# Patient Record
Sex: FEMALE | Race: WHITE | Employment: STUDENT | URBAN - METROPOLITAN AREA
[De-identification: names, ages, dates, MRNs, and addresses within clinical notes are randomized per-mention and may not be internally consistent; named-entity substitution may affect disease eponyms.]

---
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
## 2024-07-25 ENCOUNTER — HOSPITAL ENCOUNTER (OUTPATIENT)
Age: 19
Discharge: HOME | End: 2024-07-25
Payer: COMMERCIAL

## 2024-07-25 VITALS
SYSTOLIC BLOOD PRESSURE: 121 MMHG | OXYGEN SATURATION: 100 % | RESPIRATION RATE: 19 BRPM | HEART RATE: 99 BPM | DIASTOLIC BLOOD PRESSURE: 81 MMHG | TEMPERATURE: 98 F

## 2024-07-25 DIAGNOSIS — R31.9 URINARY TRACT INFECTION WITH HEMATURIA, SITE UNSPECIFIED: Primary | ICD-10-CM

## 2024-07-25 DIAGNOSIS — N39.0 URINARY TRACT INFECTION WITH HEMATURIA, SITE UNSPECIFIED: Primary | ICD-10-CM

## 2024-07-25 LAB
B-HCG UR QL: NEGATIVE
BILIRUB UR QL STRIP: NEGATIVE
COLOR UR: YELLOW
GLUCOSE UR STRIP-MCNC: NEGATIVE MG/DL
KETONES UR STRIP-MCNC: NEGATIVE MG/DL
NITRITE UR QL STRIP: NEGATIVE
PH UR STRIP: 6.5 [PH]
PROT UR STRIP-MCNC: NEGATIVE MG/DL
SP GR UR STRIP: 1.02
UROBILINOGEN UR STRIP-ACNC: <2 MG/DL

## 2024-07-25 PROCEDURE — 87186 SC STD MICRODIL/AGAR DIL: CPT | Performed by: NURSE PRACTITIONER

## 2024-07-25 PROCEDURE — 87088 URINE BACTERIA CULTURE: CPT | Performed by: NURSE PRACTITIONER

## 2024-07-25 PROCEDURE — 81002 URINALYSIS NONAUTO W/O SCOPE: CPT

## 2024-07-25 PROCEDURE — 99214 OFFICE O/P EST MOD 30 MIN: CPT

## 2024-07-25 PROCEDURE — 87491 CHLMYD TRACH DNA AMP PROBE: CPT | Performed by: NURSE PRACTITIONER

## 2024-07-25 PROCEDURE — 87591 N.GONORRHOEAE DNA AMP PROB: CPT | Performed by: NURSE PRACTITIONER

## 2024-07-25 PROCEDURE — 87086 URINE CULTURE/COLONY COUNT: CPT | Performed by: NURSE PRACTITIONER

## 2024-07-25 PROCEDURE — 99204 OFFICE O/P NEW MOD 45 MIN: CPT

## 2024-07-25 PROCEDURE — 81025 URINE PREGNANCY TEST: CPT

## 2024-07-25 RX ORDER — SULFAMETHOXAZOLE AND TRIMETHOPRIM 800; 160 MG/1; MG/1
1 TABLET ORAL 2 TIMES DAILY
Qty: 14 TABLET | Refills: 0 | Status: SHIPPED | OUTPATIENT
Start: 2024-07-25 | End: 2024-08-01

## 2024-07-25 NOTE — ED PROVIDER NOTES
Patient Seen in: [facility]      History     Chief Complaint   Patient presents with    Eval-G    Urinary Symptoms     Stated Complaint: urinary issues    Subjective:   HPI    19-year-old female presents to immediate care with complaints of urinary frequency urgency and hematuria x 5 days.  She reports body aches.  She is afebrile.  There is no nausea or vomiting.  Her last menstrual period was July 18.  Patient states there is a chance she could be pregnant.  She is not concerned about STDs.    Objective:   Past Medical History:    Psoriasis              History reviewed. No pertinent surgical history.             Social History     Socioeconomic History    Marital status: Single   Tobacco Use    Smoking status: Never    Smokeless tobacco: Never              Review of Systems    Positive for stated Chief Complaint: Eval-G and Urinary Symptoms    Other systems are as noted in HPI.  Constitutional and vital signs reviewed.      All other systems reviewed and negative except as noted above.    Physical Exam     ED Triage Vitals [07/25/24 1346]   /81   Pulse 99   Resp 19   Temp 98.2 °F (36.8 °C)   Temp src Temporal   SpO2 100 %   O2 Device None (Room air)       Current Vitals:   Vital Signs  BP: 121/81  Pulse: 99  Resp: 19  Temp: 98.2 °F (36.8 °C)  Temp src: Temporal    Oxygen Therapy  SpO2: 100 %  O2 Device: None (Room air)            Physical Exam  Vitals reviewed.   Constitutional:       General: She is not in acute distress.     Appearance: She is ill-appearing.   HENT:      Mouth/Throat:      Mouth: Mucous membranes are moist.   Cardiovascular:      Rate and Rhythm: Normal rate and regular rhythm.   Pulmonary:      Effort: Pulmonary effort is normal.      Breath sounds: Normal breath sounds.   Abdominal:      Palpations: Abdomen is soft.      Tenderness: There is no abdominal tenderness. There is no right CVA tenderness or left CVA tenderness.   Musculoskeletal:         General: Normal range of  motion.   Skin:     General: Skin is warm and dry.   Neurological:      General: No focal deficit present.      Mental Status: She is alert and oriented to person, place, and time.   Psychiatric:         Mood and Affect: Mood normal.         Behavior: Behavior normal.               ED Course     Labs Reviewed   [facility] POCT URINALYSIS DIPSTICK - Abnormal; Notable for the following components:       Result Value    Urine Clarity Cloudy (*)     Blood, Urine Moderate (*)     Leukocyte esterase urine Small (*)     All other components within normal limits   POCT PREGNANCY URINE - Normal   URINE CULTURE, ROUTINE   CHLAMYDIA/GONOCOCCUS, WESLY                      MDM                                         Medical Decision Making  19-year-old female presents with urinary frequency urgency and hematuria.  Differential diagnosis includes UTI, kidney stone, pyelonephritis, pregnancy, STDs.  Patient's last menstrual period was July 18.  Even though she states there is always a chance she could be pregnant.  She states she has not overly concerned about sexually transmitted diseases.  She states she would like to establish care with an OB/GYN and have all of that taken care of through that doctor.  Urine specimen was collected and does show small leukocytes and moderate blood.  Will send for a culture.  Urine pregnancy test is negative.  On discharge patient does request testing for gonorrhea and chlamydia.  Cultures will be sent from her urine specimen given.  Patient was encouraged to make an OB/GYN appointment.  I offered to help patient but she said her mother would help her make that appointment.  She was given printed instructions for UTI.  She was also given information regarding results of gonorrhea and chlamydia.    Amount and/or Complexity of Data Reviewed  Labs:      Details: POC urine shows small leuks, mod blood  POC urine preg is negative    Risk  OTC drugs.  Prescription drug management.        Disposition and Plan      Clinical Impression:  1. Urinary tract infection with hematuria, site unspecified         Disposition:  Discharge  7/25/2024  2:47 pm    Follow-up:  Adeline Samuel,   [address]  [address]  [place] [address]  [phone]                Medications Prescribed:  Discharge Medication List as of 7/25/2024  2:47 PM        START taking these medications    Details   sulfamethoxazole-trimethoprim -160 MG Oral Tab per tablet Take 1 tablet by mouth 2 (two) times daily for 7 days., Normal, Disp-14 tablet, R-0

## 2024-07-25 NOTE — ED INITIAL ASSESSMENT (HPI)
Presents with 5 days of urinary urgency/frequency, hematuria, and vaginal discharge. Describes cloudy discharge with foul odor. + body aches. Lower abdominal pressure. No fever.

## 2024-07-26 LAB
C TRACH DNA SPEC QL NAA+PROBE: NEGATIVE
N GONORRHOEA DNA SPEC QL NAA+PROBE: NEGATIVE

## 2024-08-14 ENCOUNTER — HOSPITAL ENCOUNTER (OUTPATIENT)
Age: 19
Discharge: HOME | End: 2024-08-14
Payer: COMMERCIAL

## 2024-08-14 VITALS
OXYGEN SATURATION: 100 % | DIASTOLIC BLOOD PRESSURE: 59 MMHG | SYSTOLIC BLOOD PRESSURE: 110 MMHG | TEMPERATURE: 99 F | HEART RATE: 85 BPM | RESPIRATION RATE: 20 BRPM

## 2024-08-14 DIAGNOSIS — N39.0 ACUTE UTI: Primary | ICD-10-CM

## 2024-08-14 LAB
B-HCG UR QL: NEGATIVE
BILIRUB UR QL STRIP: NEGATIVE
GLUCOSE UR STRIP-MCNC: 100 MG/DL
NITRITE UR QL STRIP: POSITIVE
PH UR STRIP: 5 [PH]
PROT UR STRIP-MCNC: 100 MG/DL
SP GR UR STRIP: 1.01
UROBILINOGEN UR STRIP-ACNC: 2 MG/DL

## 2024-08-14 PROCEDURE — 87086 URINE CULTURE/COLONY COUNT: CPT | Performed by: NURSE PRACTITIONER

## 2024-08-14 PROCEDURE — 87088 URINE BACTERIA CULTURE: CPT | Performed by: NURSE PRACTITIONER

## 2024-08-14 PROCEDURE — 81025 URINE PREGNANCY TEST: CPT

## 2024-08-14 PROCEDURE — 99214 OFFICE O/P EST MOD 30 MIN: CPT

## 2024-08-14 PROCEDURE — 81002 URINALYSIS NONAUTO W/O SCOPE: CPT

## 2024-08-14 PROCEDURE — 87186 SC STD MICRODIL/AGAR DIL: CPT | Performed by: NURSE PRACTITIONER

## 2024-08-14 RX ORDER — CEPHALEXIN 500 MG/1
500 CAPSULE ORAL 2 TIMES DAILY
Qty: 14 CAPSULE | Refills: 0 | Status: SHIPPED | OUTPATIENT
Start: 2024-08-14 | End: 2024-08-21

## 2024-08-14 NOTE — ED INITIAL ASSESSMENT (HPI)
Was seen here 7/25 for uti, completed antibiotic, felt better, urinary symptoms started tat 5 am, pt currently on her menses, mild suprapubic discomfort, no documented fever, no flank or back pain

## 2024-08-14 NOTE — ED PROVIDER NOTES
He    Patient Seen in: [facility]      History     Chief Complaint   Patient presents with    Urinary Symptoms     Stated Complaint: uti  Subjective:   19-year-old female with a history of psoriasis presents for dysuria, urgency, and frequency that started this morning.  No ermelinda hematuria.  She does have some suprapubic discomfort.  No flank pain.  No history of kidney stone or kidney infection in the past  She was seen  here a month ago and treated for UTI with Bactrim.  She states her symptoms did resolve and now they have returned.  No fevers or chills.  No nausea or vomiting.  She is currently on her menses.  She was tested for STDs at her last visit, so she is not concerned about STDs at this time.  She has been taking Azo with relief.  She appears nontoxic.      Objective:   Past Medical History:    Psoriasis            History reviewed. No pertinent surgical history.           Social History     Socioeconomic History    Marital status: Single   Tobacco Use    Smoking status: Never    Smokeless tobacco: Never   Vaping Use    Vaping status: Every Day   Substance and Sexual Activity    Alcohol use: Not Currently    Drug use: Not Currently            Review of Systems    Positive for stated complaint: Urinary Symptoms     Other systems are as noted in HPI.  Constitutional and vital signs reviewed.      All other systems reviewed and negative except as noted above.    Physical Exam     ED Triage Vitals [08/14/24 1438]   /59   Pulse 85   Resp 20   Temp 99 °F (37.2 °C)   Temp src Temporal   SpO2 100 %   O2 Device None (Room air)     Current:/59   Pulse 85   Temp 99 °F (37.2 °C) (Temporal)   Resp 20   LMP 08/11/2024 (Exact Date)   SpO2 100%     Physical Exam  Vitals and nursing note reviewed.   Constitutional:       Appearance: Normal appearance.   HENT:      Mouth/Throat:      Mouth: Mucous membranes are moist.   Cardiovascular:      Rate and Rhythm: Normal rate and regular rhythm.    Pulmonary:      Effort: Pulmonary effort is normal.      Breath sounds: Normal breath sounds.   Abdominal:      Palpations: Abdomen is soft.      Tenderness: There is no abdominal tenderness. There is no right CVA tenderness or left CVA tenderness.   Musculoskeletal:         General: Normal range of motion.      Cervical back: Normal range of motion.   Skin:     General: Skin is warm and dry.      Capillary Refill: Capillary refill takes less than 2 seconds.   Neurological:      General: No focal deficit present.      Mental Status: She is alert and oriented to person, place, and time.   Psychiatric:         Mood and Affect: Mood normal.         Behavior: Behavior normal.         ED Course   No results found.  Labs Reviewed   [facility] POCT URINALYSIS DIPSTICK - Abnormal; Notable for the following components:       Result Value    Urine Color Bethany (*)     Urine Clarity Slightly cloudy (*)     Protein urine 100 (*)     Glucose, Urine 100 (*)     Ketone, Urine Trace (*)     Blood, Urine Moderate (*)     Nitrite Urine Positive (*)     Urobilinogen urine 2.0 (*)     Leukocyte esterase urine Trace (*)     All other components within normal limits   POCT PREGNANCY URINE - Normal   URINE CULTURE, ROUTINE       MDM     Medical Decision Making  I will treat the patient for UTI with Keflex.  We discussed drinking plenty of fluids.  A culture is pending.  She will continue Azo as needed.  She will follow-up with her primary care doctor.    Amount and/or Complexity of Data Reviewed  Labs: ordered.     Details: The urine dip is skewed because the patient took Azo.  The UCG is negative.  A culture is pending.    Risk  OTC drugs.  Prescription drug management.  Risk Details: UTI versus pyelonephritis        Disposition and Plan     Clinical Impression:  1. Acute UTI         Disposition:  Discharge  8/14/2024  3:12 pm    Follow-up:  Adeline Samuel DO  [address]  [address]  [facility] [address]  [phone]    Schedule  an appointment as soon as possible for a visit   As needed          Medications Prescribed:  Current Discharge Medication List        START taking these medications    Details   cephalexin 500 MG Oral Cap Take 1 capsule (500 mg total) by mouth 2 (two) times daily for 7 days.  Qty: 14 capsule, Refills: 0

## 2024-08-14 NOTE — DISCHARGE INSTRUCTIONS
Push fluids.  Rest.  Continue Azo as needed.  A urine culture is pending.  Take the Keflex as prescribed.  Return for any concerns   Or worsening symptoms.

## 2024-08-27 ENCOUNTER — HOSPITAL ENCOUNTER (OUTPATIENT)
Age: 19
Discharge: HOME | End: 2024-08-27
Payer: COMMERCIAL

## 2024-08-27 VITALS
TEMPERATURE: 97 F | RESPIRATION RATE: 19 BRPM | HEART RATE: 78 BPM | SYSTOLIC BLOOD PRESSURE: 101 MMHG | OXYGEN SATURATION: 98 % | DIASTOLIC BLOOD PRESSURE: 64 MMHG

## 2024-08-27 DIAGNOSIS — N30.90 CYSTITIS: Primary | ICD-10-CM

## 2024-08-27 LAB
B-HCG UR QL: NEGATIVE
BILIRUB UR QL STRIP: NEGATIVE
CLARITY UR: CLEAR
COLOR UR: YELLOW
GLUCOSE UR STRIP-MCNC: NEGATIVE MG/DL
KETONES UR STRIP-MCNC: NEGATIVE MG/DL
NITRITE UR QL STRIP: POSITIVE
PH UR STRIP: 6 [PH]
PROT UR STRIP-MCNC: NEGATIVE MG/DL
SP GR UR STRIP: <=1.005
UROBILINOGEN UR STRIP-ACNC: <2 MG/DL

## 2024-08-27 PROCEDURE — 81002 URINALYSIS NONAUTO W/O SCOPE: CPT

## 2024-08-27 PROCEDURE — 81514 NFCT DS BV&VAGINITIS DNA ALG: CPT | Performed by: NURSE PRACTITIONER

## 2024-08-27 PROCEDURE — 81025 URINE PREGNANCY TEST: CPT

## 2024-08-27 PROCEDURE — 99214 OFFICE O/P EST MOD 30 MIN: CPT

## 2024-08-27 PROCEDURE — 87086 URINE CULTURE/COLONY COUNT: CPT | Performed by: NURSE PRACTITIONER

## 2024-08-27 PROCEDURE — 87491 CHLMYD TRACH DNA AMP PROBE: CPT | Performed by: NURSE PRACTITIONER

## 2024-08-27 PROCEDURE — 87591 N.GONORRHOEAE DNA AMP PROB: CPT | Performed by: NURSE PRACTITIONER

## 2024-08-27 RX ORDER — SULFAMETHOXAZOLE/TRIMETHOPRIM 800-160 MG
1 TABLET ORAL 2 TIMES DAILY
Qty: 14 TABLET | Refills: 0 | Status: SHIPPED | OUTPATIENT
Start: 2024-08-27 | End: 2024-09-03

## 2024-08-27 NOTE — DISCHARGE INSTRUCTIONS
Please take medication as prescribed.  We have sent the urine for culture.  I have given you gynecology follow-up and I do recommend calling today to schedule a follow-up appointment.  We will call you with the results in 2 to 3 days.

## 2024-08-27 NOTE — ED INITIAL ASSESSMENT (HPI)
Patient with dysuria despite completing course of antibiotics. She states that her sx improved but have now returned

## 2024-08-28 LAB
BV BACTERIA DNA VAG QL NAA+PROBE: NEGATIVE
C GLABRATA DNA VAG QL NAA+PROBE: NEGATIVE
C KRUSEI DNA VAG QL NAA+PROBE: NEGATIVE
C TRACH DNA SPEC QL NAA+PROBE: NEGATIVE
CANDIDA DNA VAG QL NAA+PROBE: POSITIVE
N GONORRHOEA DNA SPEC QL NAA+PROBE: NEGATIVE
T VAGINALIS DNA VAG QL NAA+PROBE: NEGATIVE

## 2024-08-28 RX ORDER — FLUCONAZOLE 150 MG/1
150 TABLET ORAL ONCE
Qty: 1 TABLET | Refills: 0 | Status: SHIPPED | OUTPATIENT
Start: 2024-08-28 | End: 2024-08-28